# Patient Record
(demographics unavailable — no encounter records)

---

## 2025-01-27 NOTE — COUNSELING
[Weight management counseling provided] : Weight management [Healthy eating counseling provided] : healthy eating [Activity counseling provided] : activity [Behavioral health counseling provided] : behavioral health  [Engage in a relaxing activity] : Engage in a relaxing activity [Patient motivation] : Patient motivation [Needs reinforcement, provided] : Patient needs reinforcement on understanding lifestyle changes and  the steps needed to achieve self management goals and reinforcement was provided

## 2025-02-05 NOTE — HISTORY OF PRESENT ILLNESS
[FreeTextEntry1] : CPE [de-identified] : Ms. EDSON DEAN is a 29 year old White  female  with history of  depression, anxiety, fatigue, IBS presented today for comprehensive evaluation. Last seen by Dr. Mcmanus 10/2/23.  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  Today's PHQ9 score 20, mainly poor sleep and low energy issues. Denies SI/HI. She reports irregular, heavy periods with moderate cramps. She tried OCP, but it aggravated her more mood swing. Her sister diagnosed with PCOS.  She complains of Kaleidoscope like vision change with headache in 2023 and 3 times in 2024 and concerns.  Denied fever, chills,CP, SOB, abdominal pain, n/v/c/d.  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 Completed Physical therapy training at  [place] Feels tired in am, legs feel sometimes tired and unsteady. Palpitation when resting. Admits water intake could be better. Standing and still and has sense she is still moving-does jiujutsu/Wrestling x 2 year. Hx of moving around and feels off balance, or if standing up. Familar with Gustavo and Mel , as she was trained as physical therapist. Broke up long distant relationship in [facility] Dec 2021. Dating new person now. Stopped her BCP, as it affected her mood. Hx of MALCOLM, had seen therapist in past, off all meds. Mood stable. Had been on Paxil and zoloft in distant past for less than a year, currently on no meds  Monogamous, saw gyn with Pap-ascus.  STD screen normal.  TOP 3/19/2019 Hx IBS-stable

## 2025-02-05 NOTE — ASSESSMENT
[FreeTextEntry1] : Ms. ESDON DEAN is a 29 year old White  female  with history of  depression, anxiety, fatigue, IBS presented today for comprehensive evaluation.  # HCM  -COVID shot:got initial 2 doses, no booster -Flu shot: she did not get flu shot this season -Tdap shot: 3/12/18 utd -pap smear: 9/5/23 PAP/HPV neg/neg, not using OCP due to A/E, using condomes -SBIRT: socially smoking MJ.   # CV /70 HR 74 systolic murmur, hx of arrhythmia, occasional positional lightheaded, no syncope.  -Today's EKG: Sinus Bradycardia 58bpm, -Horizontal axis for age. funnel chest, BORDERLINE Made referral to cardiology for holter monitor.   # anxiety/depression -PHQ9 score 20, mainly poor sleep and low energey issue, denied SI/HI. Pt does not wish daily BH medication but open to therapy. Made referral to our LSW Maryam.   # vision change ? 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 -F/up in 1 year or prn.

## 2025-02-05 NOTE — HEALTH RISK ASSESSMENT
[Very Good] : ~his/her~ current health as very good [No falls in past year] : Patient reported no falls in the past year [0] : 2) Feeling down, depressed, or hopeless: Not at all (0) [PHQ-2 Negative - No further assessment needed] : PHQ-2 Negative - No further assessment needed [Very Difficult] : How difficult have these problems made it for you to do your work, take care of things at home, or get along with people? Very difficult [PHQ-9 Positive] : PHQ-9 Positive [Patient reported PAP Smear was normal] : Patient reported PAP Smear was normal [None] : None [Fully functional (bathing, dressing, toileting, transferring, walking, feeding)] : Fully functional (bathing, dressing, toileting, transferring, walking, feeding) [Fully functional (using the telephone, shopping, preparing meals, housekeeping, doing laundry, using] : Fully functional and needs no help or supervision to perform IADLs (using the telephone, shopping, preparing meals, housekeeping, doing laundry, using transportation, managing medications and managing finances) [Patient declined discussion] : Patient declined discussion [Single] : single [Sexually Active] : sexually active [2] : 1) Little interest or pleasure doing things for more than half of the days (2) [1] : 2) Feeling down, depressed, or hopeless for several days (1) [PHQ-2 Positive] : PHQ-2 Positive [Several Days (1)] : 2.) Feeling down, depressed or hopeless? Several days [1/2 of Days or More (2)] : 6.) Feeling bad about yourself, or that you are a failure, or have let yourself or your family down? Half the days or more [Nearly Every Day (3)] : 8.) Moving or speaking so slowly that other people could have noticed, or the opposite, moving or speaking faster than usual? Nearly every day [Not at All (0)] : 9.) Thoughts that you would be off dead or of hurting yourself in some way? Not at all [Severe] : Severity of Depression is Severe [Somewhat Difficult] : How difficult have these problems made it for you to do your work, take care of things at home, or get along with people? Somewhat difficult [No] : No [1 or 2 (0 pts)] : 1 or 2 (0 points) [Never (0 pts)] : Never (0 points) [Yes] : In the past 12 months have you used drugs other than those required for medical reasons? Yes [I have developed a follow-up plan documented below in the note.] : I have developed a follow-up plan documented below in the note. [Never] : Never [HIV test declined] : HIV test declined [Hepatitis C test declined] : Hepatitis C test declined [With Family] : lives with family [Employed] : employed [Feels Safe at Home] : Feels safe at home [Smoke Detector] : smoke detector [Seat Belt] :  uses seat belt [With Patient/Caregiver] : , with patient/caregiver [Good] : ~his/her~ current health as good [FreeTextEntry1] : eating right-no eating disorder [de-identified] : see hpi [Audit-CScore] : 0 [de-identified] : socially smoking MJ [de-identified] : regularly work out [de-identified] : eats not that healthy food. 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

## 2025-02-05 NOTE — HISTORY OF PRESENT ILLNESS
[FreeTextEntry1] : CPE [de-identified] : Ms. EDSON DEAN is a 29 year old White  female  with history of  depression, anxiety, fatigue, IBS presented today for comprehensive evaluation. Last seen by Dr. Mcmanus 10/2/23.  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  Today's PHQ9 score 20, mainly poor sleep and low energy issues. Denies SI/HI. She reports irregular, heavy periods with moderate cramps. She tried OCP, but it aggravated her more mood swing. Her sister diagnosed with PCOS.  She complains of Kaleidoscope like vision change with headache in 2023 and 3 times in 2024 and concerns.  Denied fever, chills,CP, SOB, abdominal pain, n/v/c/d.  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 Completed Physical therapy training at  [place] Feels tired in am, legs feel sometimes tired and unsteady. Palpitation when resting. Admits water intake could be better. Standing and still and has sense she is still moving-does jiujutsu/Wrestling x 2 year. Hx of moving around and feels off balance, or if standing up. Familar with Gustavo and Mel , as she was trained as physical therapist. Broke up long distant relationship in [facility] Dec 2021. Dating new person now. Stopped her BCP, as it affected her mood. Hx of MALCOLM, had seen therapist in past, off all meds. Mood stable. Had been on Paxil and zoloft in distant past for less than a year, currently on no meds  Monogamous, saw gyn with Pap-ascus.  STD screen normal.  TOP 3/19/2019 Hx IBS-stable

## 2025-02-05 NOTE — HEALTH RISK ASSESSMENT
[Very Good] : ~his/her~ current health as very good [No falls in past year] : Patient reported no falls in the past year [0] : 2) Feeling down, depressed, or hopeless: Not at all (0) [PHQ-2 Negative - No further assessment needed] : PHQ-2 Negative - No further assessment needed [Very Difficult] : How difficult have these problems made it for you to do your work, take care of things at home, or get along with people? Very difficult [PHQ-9 Positive] : PHQ-9 Positive [Patient reported PAP Smear was normal] : Patient reported PAP Smear was normal [None] : None [Fully functional (bathing, dressing, toileting, transferring, walking, feeding)] : Fully functional (bathing, dressing, toileting, transferring, walking, feeding) [Fully functional (using the telephone, shopping, preparing meals, housekeeping, doing laundry, using] : Fully functional and needs no help or supervision to perform IADLs (using the telephone, shopping, preparing meals, housekeeping, doing laundry, using transportation, managing medications and managing finances) [Patient declined discussion] : Patient declined discussion [Single] : single [Sexually Active] : sexually active [2] : 1) Little interest or pleasure doing things for more than half of the days (2) [1] : 2) Feeling down, depressed, or hopeless for several days (1) [PHQ-2 Positive] : PHQ-2 Positive [Several Days (1)] : 2.) Feeling down, depressed or hopeless? Several days [1/2 of Days or More (2)] : 6.) Feeling bad about yourself, or that you are a failure, or have let yourself or your family down? Half the days or more [Nearly Every Day (3)] : 8.) Moving or speaking so slowly that other people could have noticed, or the opposite, moving or speaking faster than usual? Nearly every day [Not at All (0)] : 9.) Thoughts that you would be off dead or of hurting yourself in some way? Not at all [Severe] : Severity of Depression is Severe [Somewhat Difficult] : How difficult have these problems made it for you to do your work, take care of things at home, or get along with people? Somewhat difficult [No] : No [1 or 2 (0 pts)] : 1 or 2 (0 points) [Never (0 pts)] : Never (0 points) [Yes] : In the past 12 months have you used drugs other than those required for medical reasons? Yes [I have developed a follow-up plan documented below in the note.] : I have developed a follow-up plan documented below in the note. [Never] : Never [HIV test declined] : HIV test declined [Hepatitis C test declined] : Hepatitis C test declined [With Family] : lives with family [Employed] : employed [Feels Safe at Home] : Feels safe at home [Smoke Detector] : smoke detector [Seat Belt] :  uses seat belt [With Patient/Caregiver] : , with patient/caregiver [Good] : ~his/her~ current health as good [FreeTextEntry1] : eating right-no eating disorder [de-identified] : see hpi [Audit-CScore] : 0 [de-identified] : socially smoking MJ [de-identified] : regularly work out [de-identified] : eats not that healthy food. 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

## 2025-02-05 NOTE — REVIEW OF SYSTEMS
[Negative] : Heme/Lymph [Fatigue] : fatigue [de-identified] : bump noted on arm [de-identified] : see hpi

## 2025-02-05 NOTE — HISTORY OF PRESENT ILLNESS
[FreeTextEntry1] : CPE [de-identified] : Ms. EDSON DEAN is a 29 year old White  female  with history of  depression, anxiety, fatigue, IBS presented today for comprehensive evaluation. Last seen by Dr. Mcmanus 10/2/23.  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  Today's PHQ9 score 20, mainly poor sleep and low energy issues. Denies SI/HI. She reports irregular, heavy periods with moderate cramps. She tried OCP, but it aggravated her more mood swing. Her sister diagnosed with PCOS.  She complains of Kaleidoscope like vision change with headache in 2023 and 3 times in 2024 and concerns.  Denied fever, chills,CP, SOB, abdominal pain, n/v/c/d.  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 Palpitation when resting. Admits water intake could be better. Standing and still and has sense she is still moving-does jiujutsu/Wrestling x 2 year. Hx of moving around and feels off balance, or if standing up. Familar with Gustavo and Mel , as she was trained as physical therapist. Broke up long distant relationship in [facility] Dec 2021. Dating new person now. Stopped her BCP, as it affected her mood. Hx of MALCOLM, had seen therapist in past, off all meds. Mood stable. Had been on Paxil and zoloft in distant past for less than a year, currently on no meds  Monogamous, saw gyn with Pap-ascus.  STD screen normal.  TOP 3/19/2019 Hx IBS-stable

## 2025-02-05 NOTE — REVIEW OF SYSTEMS
[Negative] : Heme/Lymph [Fatigue] : fatigue [de-identified] : bump noted on arm [de-identified] : see hpi

## 2025-02-05 NOTE — REVIEW OF SYSTEMS
[Negative] : Heme/Lymph [Fatigue] : fatigue [de-identified] : bump noted on arm [de-identified] : see hpi

## 2025-02-05 NOTE — ASSESSMENT
[FreeTextEntry1] : Ms. EDSON DEAN is a 29 year old White  female  with history of  depression, anxiety, fatigue, IBS presented today for comprehensive evaluation.  # HCM  -COVID shot:got initial 2 doses, no booster -Flu shot: she did not get flu shot this season -Tdap shot: 3/12/18 utd -pap smear: 9/5/23 PAP/HPV neg/neg, not using OCP due to A/E, using condomes -SBIRT: socially smoking MJ.   # CV /70 HR 74 systolic murmur, hx of arrhythmia, occasional positional lightheaded, no syncope.  -Today's EKG: Sinus Bradycardia 58bpm, -Horizontal axis for age. funnel chest, BORDERLINE Made referral to cardiology for holter monitor.   # anxiety/depression -PHQ9 score 20, mainly poor sleep and low energey issue, denied SI/HI. Pt does not wish daily BH medication but open to therapy. Made referral to our LSW Maryam.   # vision change ? 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 -F/up in 1 year or prn.

## 2025-02-05 NOTE — PHYSICAL EXAM
[No Acute Distress] : no acute distress [Well Nourished] : well nourished [Well Developed] : well developed [Well-Appearing] : well-appearing [Normal Sclera/Conjunctiva] : normal sclera/conjunctiva [PERRL] : pupils equal round and reactive to light [EOMI] : extraocular movements intact [Normal Outer Ear/Nose] : the outer ears and nose were normal in appearance [Normal Oropharynx] : the oropharynx was normal [No JVD] : no jugular venous distention [No Lymphadenopathy] : no lymphadenopathy [Supple] : supple [Thyroid Normal, No Nodules] : the thyroid was normal and there were no nodules present [No Respiratory Distress] : no respiratory distress  [No Accessory Muscle Use] : no accessory muscle use [Clear to Auscultation] : lungs were clear to auscultation bilaterally [Normal Rate] : normal rate  [Regular Rhythm] : with a regular rhythm [Normal S1, S2] : normal S1 and S2 [No Murmur] : no murmur heard [No Carotid Bruits] : no carotid bruits [No Abdominal Bruit] : a ~M bruit was not heard ~T in the abdomen [No Varicosities] : no varicosities [Pedal Pulses Present] : the pedal pulses are present [No Edema] : there was no peripheral edema [No Palpable Aorta] : no palpable aorta [No Extremity Clubbing/Cyanosis] : no extremity clubbing/cyanosis [Normal Appearance] : normal in appearance [No Nipple Discharge] : no nipple discharge [No Axillary Lymphadenopathy] : no axillary lymphadenopathy [Soft] : abdomen soft [Non Tender] : non-tender [Non-distended] : non-distended [No Masses] : no abdominal mass palpated [No HSM] : no HSM [Normal Bowel Sounds] : normal bowel sounds [No CVA Tenderness] : no CVA  tenderness [No Spinal Tenderness] : no spinal tenderness [No Joint Swelling] : no joint swelling [Grossly Normal Strength/Tone] : grossly normal strength/tone [No Rash] : no rash [Coordination Grossly Intact] : coordination grossly intact [No Focal Deficits] : no focal deficits [Normal Gait] : normal gait [Deep Tendon Reflexes (DTR)] : deep tendon reflexes were 2+ and symmetric [Normal Affect] : the affect was normal [Normal Insight/Judgement] : insight and judgment were intact [Declined Breast Exam] : declined breast exam

## 2025-02-05 NOTE — HEALTH RISK ASSESSMENT
[Very Good] : ~his/her~ current health as very good [No falls in past year] : Patient reported no falls in the past year [0] : 2) Feeling down, depressed, or hopeless: Not at all (0) [PHQ-2 Negative - No further assessment needed] : PHQ-2 Negative - No further assessment needed [Very Difficult] : How difficult have these problems made it for you to do your work, take care of things at home, or get along with people? Very difficult [PHQ-9 Positive] : PHQ-9 Positive [Patient reported PAP Smear was normal] : Patient reported PAP Smear was normal [None] : None [Fully functional (bathing, dressing, toileting, transferring, walking, feeding)] : Fully functional (bathing, dressing, toileting, transferring, walking, feeding) [Fully functional (using the telephone, shopping, preparing meals, housekeeping, doing laundry, using] : Fully functional and needs no help or supervision to perform IADLs (using the telephone, shopping, preparing meals, housekeeping, doing laundry, using transportation, managing medications and managing finances) [Patient declined discussion] : Patient declined discussion [Single] : single [Sexually Active] : sexually active [2] : 1) Little interest or pleasure doing things for more than half of the days (2) [1] : 2) Feeling down, depressed, or hopeless for several days (1) [PHQ-2 Positive] : PHQ-2 Positive [Several Days (1)] : 2.) Feeling down, depressed or hopeless? Several days [1/2 of Days or More (2)] : 6.) Feeling bad about yourself, or that you are a failure, or have let yourself or your family down? Half the days or more [Nearly Every Day (3)] : 8.) Moving or speaking so slowly that other people could have noticed, or the opposite, moving or speaking faster than usual? Nearly every day [Not at All (0)] : 9.) Thoughts that you would be off dead or of hurting yourself in some way? Not at all [Severe] : Severity of Depression is Severe [Somewhat Difficult] : How difficult have these problems made it for you to do your work, take care of things at home, or get along with people? Somewhat difficult [No] : No [1 or 2 (0 pts)] : 1 or 2 (0 points) [Never (0 pts)] : Never (0 points) [Yes] : In the past 12 months have you used drugs other than those required for medical reasons? Yes [I have developed a follow-up plan documented below in the note.] : I have developed a follow-up plan documented below in the note. [Never] : Never [HIV test declined] : HIV test declined [Hepatitis C test declined] : Hepatitis C test declined [With Family] : lives with family [Employed] : employed [Feels Safe at Home] : Feels safe at home [Smoke Detector] : smoke detector [Seat Belt] :  uses seat belt [With Patient/Caregiver] : , with patient/caregiver [Good] : ~his/her~ current health as good [FreeTextEntry1] : eating right-no eating disorder [de-identified] : see hpi [Audit-CScore] : 0 [de-identified] : socially smoking MJ [de-identified] : regularly work out [de-identified] : eats not that healthy food. 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